# Patient Record
Sex: MALE | Race: AMERICAN INDIAN OR ALASKA NATIVE
[De-identification: names, ages, dates, MRNs, and addresses within clinical notes are randomized per-mention and may not be internally consistent; named-entity substitution may affect disease eponyms.]

---

## 2020-01-05 ENCOUNTER — HOSPITAL ENCOUNTER (INPATIENT)
Dept: HOSPITAL 5 - ED | Age: 75
LOS: 2 days | Discharge: HOME | DRG: 872 | End: 2020-01-07
Attending: INTERNAL MEDICINE | Admitting: INTERNAL MEDICINE
Payer: MEDICARE

## 2020-01-05 DIAGNOSIS — R39.12: ICD-10-CM

## 2020-01-05 DIAGNOSIS — E87.1: ICD-10-CM

## 2020-01-05 DIAGNOSIS — N40.1: ICD-10-CM

## 2020-01-05 DIAGNOSIS — N39.0: ICD-10-CM

## 2020-01-05 DIAGNOSIS — E86.0: ICD-10-CM

## 2020-01-05 DIAGNOSIS — E16.2: ICD-10-CM

## 2020-01-05 DIAGNOSIS — I10: ICD-10-CM

## 2020-01-05 DIAGNOSIS — A41.9: Primary | ICD-10-CM

## 2020-01-05 DIAGNOSIS — F17.210: ICD-10-CM

## 2020-01-05 LAB
ALBUMIN SERPL-MCNC: 3.9 G/DL (ref 3.9–5)
ALT SERPL-CCNC: 9 UNITS/L (ref 7–56)
BASOPHILS # (AUTO): 0 K/MM3 (ref 0–0.1)
BASOPHILS NFR BLD AUTO: 0.2 % (ref 0–1.8)
BILIRUB UR QL STRIP: (no result)
BLOOD UR QL VISUAL: (no result)
BUN SERPL-MCNC: 8 MG/DL (ref 9–20)
BUN/CREAT SERPL: 9 %
CALCIUM SERPL-MCNC: 9.8 MG/DL (ref 8.4–10.2)
EOSINOPHIL # BLD AUTO: 0 K/MM3 (ref 0–0.4)
EOSINOPHIL NFR BLD AUTO: 0.3 % (ref 0–4.3)
HCT VFR BLD CALC: 47.6 % (ref 35.5–45.6)
HEMOLYSIS INDEX: 22
HGB BLD-MCNC: 15.8 GM/DL (ref 11.8–15.2)
LYMPHOCYTES # BLD AUTO: 1.9 K/MM3 (ref 1.2–5.4)
LYMPHOCYTES NFR BLD AUTO: 13.5 % (ref 13.4–35)
MCHC RBC AUTO-ENTMCNC: 33 % (ref 32–34)
MCV RBC AUTO: 95 FL (ref 84–94)
MONOCYTES # (AUTO): 0.9 K/MM3 (ref 0–0.8)
MONOCYTES % (AUTO): 6.1 % (ref 0–7.3)
MUCOUS THREADS #/AREA URNS HPF: (no result) /HPF
PH UR STRIP: 5 [PH] (ref 5–7)
PLATELET # BLD: 202 K/MM3 (ref 140–440)
RBC # BLD AUTO: 5.02 M/MM3 (ref 3.65–5.03)
RBC #/AREA URNS HPF: > 182 /HPF (ref 0–6)
UROBILINOGEN UR-MCNC: < 2 MG/DL (ref ?–2)
WBC #/AREA URNS HPF: > 182 /HPF (ref 0–6)

## 2020-01-05 PROCEDURE — 81001 URINALYSIS AUTO W/SCOPE: CPT

## 2020-01-05 PROCEDURE — 87086 URINE CULTURE/COLONY COUNT: CPT

## 2020-01-05 PROCEDURE — 36415 COLL VENOUS BLD VENIPUNCTURE: CPT

## 2020-01-05 PROCEDURE — 87040 BLOOD CULTURE FOR BACTERIA: CPT

## 2020-01-05 PROCEDURE — 80048 BASIC METABOLIC PNL TOTAL CA: CPT

## 2020-01-05 PROCEDURE — 76770 US EXAM ABDO BACK WALL COMP: CPT

## 2020-01-05 PROCEDURE — 93010 ELECTROCARDIOGRAM REPORT: CPT

## 2020-01-05 PROCEDURE — 85025 COMPLETE CBC W/AUTO DIFF WBC: CPT

## 2020-01-05 PROCEDURE — 93005 ELECTROCARDIOGRAM TRACING: CPT

## 2020-01-05 PROCEDURE — 71046 X-RAY EXAM CHEST 2 VIEWS: CPT

## 2020-01-05 PROCEDURE — 80053 COMPREHEN METABOLIC PANEL: CPT

## 2020-01-05 PROCEDURE — 99406 BEHAV CHNG SMOKING 3-10 MIN: CPT

## 2020-01-05 PROCEDURE — 85027 COMPLETE CBC AUTOMATED: CPT

## 2020-01-05 PROCEDURE — 82140 ASSAY OF AMMONIA: CPT

## 2020-01-05 PROCEDURE — G0378 HOSPITAL OBSERVATION PER HR: HCPCS

## 2020-01-05 PROCEDURE — 84484 ASSAY OF TROPONIN QUANT: CPT

## 2020-01-05 PROCEDURE — 96374 THER/PROPH/DIAG INJ IV PUSH: CPT

## 2020-01-05 RX ADMIN — SODIUM CHLORIDE SCH MLS/HR: 0.9 INJECTION, SOLUTION INTRAVENOUS at 23:34

## 2020-01-05 NOTE — EMERGENCY DEPARTMENT REPORT
ED General Adult HPI





- General


Chief complaint: Fever


Stated complaint: FEVER


Time Seen by Provider: 01/05/20 12:47


Source: patient


Mode of arrival: Ambulatory


Limitations: No Limitations





- History of Present Illness


Initial comments: 





Patient is a 74-year-old  male with past history of hypertension

and probable BPH who is presenting with fever for the last 2 days.  Patient 

lives in [place] but him and his wife had driven to [place] for the holidays.  

There are no way back to [place] that the patient was feeling ill and didn't 

feel as though he continued on the Kin.  Patient's has mild nausea suprapubic

pain and dysuria and malaise.  Patient denies cough congestion.  Patient has had

subjective fevers and chills.


Severity scale (0 -10): 0





- Related Data


                                    Allergies











Allergy/AdvReac Type Severity Reaction Status Date / Time


 


No Known Allergies Allergy   Unverified 01/05/20 12:41














ED Review of Systems


ROS: 


Stated complaint: FEVER


Other details as noted in HPI





Comment: All other systems reviewed and negative





ED Past Medical Hx





- Past Medical History


Previous Medical History?: Yes


Hx Hypertension: Yes


Additional medical history: Right inquinal hernia, Dysuria with frequent 

urination





- Surgical History


Past Surgical History?: Yes


Additional Surgical History: Right inquinal hernia repair





- Social History


Smoking Status: Current Every Day Smoker


Substance Use Type: Alcohol





ED Physical Exam





- General


Limitations: No Limitations


General appearance: alert, in no apparent distress





- Head


Head exam: Present: atraumatic, normocephalic





- Eye


Eye exam: Present: normal appearance.  Absent: PERRL, EOMI





- ENT


ENT exam: Present: normal orophraynx, mucous membranes moist





- Neck


Neck exam: Present: normal inspection





- Respiratory


Respiratory exam: Present: normal lung sounds bilaterally.  Absent: respiratory 

distress, wheezes, rales, rhonchi, stridor





- Cardiovascular


Cardiovascular Exam: Present: normal rhythm, tachycardia.  Absent: systolic 

murmur, diastolic murmur, rubs, gallop





- GI/Abdominal


GI/Abdominal exam: Present: soft, tenderness (suprapubic), normal bowel sounds. 

Absent: distended, guarding, rebound





- Rectal


Rectal exam: Present: deferred





- Extremities Exam


Extremities exam: Present: normal inspection





- Back Exam


Back exam: Present: normal inspection





- Neurological Exam


Neurological exam: Present: alert, oriented X3





- Psychiatric


Psychiatric exam: Present: normal affect, normal mood





- Skin


Skin exam: Present: warm, dry, intact, normal color.  Absent: rash





ED Course





                                   Vital Signs











  01/05/20 01/05/20 01/05/20





  12:44 18:02 18:26


 


Temperature 98.8 F 100.5 F H 


 


Pulse Rate 110 H 94 H 


 


Respiratory 20 20 20





Rate   


 


Blood Pressure 186/89  


 


Blood Pressure  156/80 





[Right]   


 


O2 Sat by Pulse 96 94 





Oximetry   














  01/05/20





  19:12


 


Temperature 


 


Pulse Rate 


 


Respiratory 16





Rate 


 


Blood Pressure 


 


Blood Pressure 





[Right] 


 


O2 Sat by Pulse 





Oximetry 














ED Medical Decision Making





- Lab Data


Result diagrams: 


                                 01/05/20 14:22





                                 01/05/20 14:22








                                   Lab Results











  01/05/20 01/05/20 01/05/20 Range/Units





  14:22 14:22 16:22 


 


WBC  14.0 H    (4.5-11.0)  K/mm3


 


RBC  5.02    (3.65-5.03)  M/mm3


 


Hgb  15.8 H    (11.8-15.2)  gm/dl


 


Hct  47.6 H    (35.5-45.6)  %


 


MCV  95 H    (84-94)  fl


 


MCH  31    (28-32)  pg


 


MCHC  33    (32-34)  %


 


RDW  14.2    (13.2-15.2)  %


 


Plt Count  202    (140-440)  K/mm3


 


Lymph % (Auto)  13.5    (13.4-35.0)  %


 


Mono % (Auto)  6.1    (0.0-7.3)  %


 


Eos % (Auto)  0.3    (0.0-4.3)  %


 


Baso % (Auto)  0.2    (0.0-1.8)  %


 


Lymph #  1.9    (1.2-5.4)  K/mm3


 


Mono #  0.9 H    (0.0-0.8)  K/mm3


 


Eos #  0.0    (0.0-0.4)  K/mm3


 


Baso #  0.0    (0.0-0.1)  K/mm3


 


Seg Neutrophils %  79.9 H    (40.0-70.0)  %


 


Seg Neutrophils #  11.2 H    (1.8-7.7)  K/mm3


 


Sodium   131 L   (137-145)  mmol/L


 


Potassium   4.0   (3.6-5.0)  mmol/L


 


Chloride   97.9 L   ()  mmol/L


 


Carbon Dioxide   22   (22-30)  mmol/L


 


Anion Gap   15   mmol/L


 


BUN   8 L   (9-20)  mg/dL


 


Creatinine   0.9   (0.8-1.5)  mg/dL


 


Estimated GFR   > 60   ml/min


 


BUN/Creatinine Ratio   9   %


 


Glucose   114 H   ()  mg/dL


 


Lactic Acid     (0.7-2.0)  mmol/L


 


Calcium   9.8   (8.4-10.2)  mg/dL


 


Total Bilirubin   0.60   (0.1-1.2)  mg/dL


 


AST   15   (5-40)  units/L


 


ALT   9   (7-56)  units/L


 


Alkaline Phosphatase   71   ()  units/L


 


Troponin T   < 0.010  < 0.010  (0.00-0.029)  ng/mL


 


Total Protein   7.1   (6.3-8.2)  g/dL


 


Albumin   3.9   (3.9-5)  g/dL


 


Albumin/Globulin Ratio   1.2   %


 


Urine Color     (Yellow)  


 


Urine Turbidity     (Clear)  


 


Urine pH     (5.0-7.0)  


 


Ur Specific Gravity     (1.003-1.030)  


 


Urine Protein     (Negative)  mg/dL


 


Urine Glucose (UA)     (Negative)  mg/dL


 


Urine Ketones     (Negative)  mg/dL


 


Urine Blood     (Negative)  


 


Urine Nitrite     (Negative)  


 


Urine Bilirubin     (Negative)  


 


Urine Urobilinogen     (<2.0)  mg/dL


 


Ur Leukocyte Esterase     (Negative)  


 


Urine WBC (Auto)     (0.0-6.0)  /HPF


 


Urine RBC (Auto)     (0.0-6.0)  /HPF


 


Urine WBC Clumps     /HPF


 


Urine Mucus     /HPF














  01/05/20 01/05/20 Range/Units





  18:21 Unknown 


 


WBC    (4.5-11.0)  K/mm3


 


RBC    (3.65-5.03)  M/mm3


 


Hgb    (11.8-15.2)  gm/dl


 


Hct    (35.5-45.6)  %


 


MCV    (84-94)  fl


 


MCH    (28-32)  pg


 


MCHC    (32-34)  %


 


RDW    (13.2-15.2)  %


 


Plt Count    (140-440)  K/mm3


 


Lymph % (Auto)    (13.4-35.0)  %


 


Mono % (Auto)    (0.0-7.3)  %


 


Eos % (Auto)    (0.0-4.3)  %


 


Baso % (Auto)    (0.0-1.8)  %


 


Lymph #    (1.2-5.4)  K/mm3


 


Mono #    (0.0-0.8)  K/mm3


 


Eos #    (0.0-0.4)  K/mm3


 


Baso #    (0.0-0.1)  K/mm3


 


Seg Neutrophils %    (40.0-70.0)  %


 


Seg Neutrophils #    (1.8-7.7)  K/mm3


 


Sodium    (137-145)  mmol/L


 


Potassium    (3.6-5.0)  mmol/L


 


Chloride    ()  mmol/L


 


Carbon Dioxide    (22-30)  mmol/L


 


Anion Gap    mmol/L


 


BUN    (9-20)  mg/dL


 


Creatinine    (0.8-1.5)  mg/dL


 


Estimated GFR    ml/min


 


BUN/Creatinine Ratio    %


 


Glucose    ()  mg/dL


 


Lactic Acid  1.60   (0.7-2.0)  mmol/L


 


Calcium    (8.4-10.2)  mg/dL


 


Total Bilirubin    (0.1-1.2)  mg/dL


 


AST    (5-40)  units/L


 


ALT    (7-56)  units/L


 


Alkaline Phosphatase    ()  units/L


 


Troponin T    (0.00-0.029)  ng/mL


 


Total Protein    (6.3-8.2)  g/dL


 


Albumin    (3.9-5)  g/dL


 


Albumin/Globulin Ratio    %


 


Urine Color   Yellow  (Yellow)  


 


Urine Turbidity   Cloudy  (Clear)  


 


Urine pH   5.0  (5.0-7.0)  


 


Ur Specific Gravity   1.019  (1.003-1.030)  


 


Urine Protein   30 mg/dl  (Negative)  mg/dL


 


Urine Glucose (UA)   Neg  (Negative)  mg/dL


 


Urine Ketones   Neg  (Negative)  mg/dL


 


Urine Blood   Lg  (Negative)  


 


Urine Nitrite   Pos  (Negative)  


 


Urine Bilirubin   Neg  (Negative)  


 


Urine Urobilinogen   < 2.0  (<2.0)  mg/dL


 


Ur Leukocyte Esterase   Lg  (Negative)  


 


Urine WBC (Auto)   > 182.0 H  (0.0-6.0)  /HPF


 


Urine RBC (Auto)   > 182.0  (0.0-6.0)  /HPF


 


Urine WBC Clumps   1+  /HPF


 


Urine Mucus   Few  /HPF














- Medical Decision Making





Patient is a 74-year-old male who is presenting with suprapubic discomfort fever

 and dysuria.  Patient has a compensated urinary tract infection.  White count 

is elevated.  Patient will be started on cefepime admitted to the hospital 

observation status.  Lactic acid is been ordered.  Fluids been initiated the 

patient's mild tachycardia and hyponatremia.


Critical Care Time: Yes (30)


Critical care attestation.: 


If time is entered above; I have spent that time in minutes in the direct care 

of this critically ill patient, excluding procedure time.








ED Disposition


Clinical Impression: 


 Complicated UTI (urinary tract infection), Sepsis, Hyponatremia, Mild 

dehydration





Disposition: DC-09 OP ADMIT IP TO THIS HOSP


Is pt being admited?: Yes


Does the pt Need Aspirin: No


Condition: Stable


Time of Disposition: 19:41

## 2020-01-05 NOTE — EVENT NOTE
ED Screening Note


Date of service: 01/05/20


Time: 12:48


ED Screening Note: 


Pt complains of right chest discomfort, fever, and urinary urgency x yesterday 


+dysuria 


denies cough or SOB





This initial assessment/diagnostic orders/clinical plan/treatment(s) is/are 

subject to change based on patients health status, clinical progression and re-

assessment by fellow clinical providers in the ED. Further treatment and workup 

at subsequent clinical providers discretion. Patient/guardian urged not to elope

from the ED as their condition may be serious if not clinically assessed and 

managed. 





Initial orders include: 


CXR


labs


EKG

## 2020-01-05 NOTE — HISTORY AND PHYSICAL REPORT
History of Present Illness


Date of admission: 


01/05/20 19:42





Chief complaint: 





Dysuria and pelvic pain


History of present illness: 





74-year-old man with a history of BPH.  He presents to the hospital complaining 

of dysuria, poor stream of urination.  Fever, feeling ill.  He also noted that 

he had a small amounts of blood at the end of urination.  Him and his wife were 

driving back from [place] to [place] on a road trip when he became very ill and

he had to stop in the hospital.  The pain in his lower abdomen/pelvis became so 

severe that he thought perhaps that hernia was bursting his stomach as he had 

hernias in the past.  The pain has now resolved.





Past medical history; BPH, off medications that [facility].  Hypertension, prediabetes





Past surgical history, hernia repair x2





Social history, smoker, last cigarette was 2 days ago, denies alcohol or illicit

drug use.  He is retired and lives with his wife he is a .





Family history denies any family history of prostate cancer or prostate issues 

and other family members.











Medications and Allergies


                                    Allergies











Allergy/AdvReac Type Severity Reaction Status Date / Time


 


No Known Allergies Allergy   Unverified 01/05/20 12:41











                                Home Medications











 Medication  Instructions  Recorded  Confirmed  Last Taken  Type


 


amLODIPine [Norvasc] 10 mg PO DAILY 01/05/20 01/05/20 Unknown History











Active Meds: 


Active Medications





Acetaminophen (Tylenol)  650 mg PO Q4H PRN


   PRN Reason: Pain MILD(1-3)/Fever >100.5/HA


Amlodipine Besylate (Amlodipine)  10 mg PO DAILY [facility]


Enoxaparin Sodium (Enoxaparin)  40 mg SUB-Q QDAY@2200 [facility]


Cefepime HCl (Cefepime/Ns 2 Gm/100 Ml)  2 gm in 100 mls @ 200 mls/hr IV Q12HR 

[facility]; Protocol


   Last Admin: 01/05/20 19:12 Dose:  200 mls/hr


   Documented by: 


Ceftriaxone Sodium (Rocephin/Ns 1 Gm/50 Ml)  1 gm in 50 mls @ 100 mls/hr IV 

Q24HR [facility]; Protocol


Sodium Chloride (Nacl 0.9% 1000 Ml)  1,000 mls @ 100 mls/hr IV AS DIRECT [facility]


Ondansetron HCl (Zofran)  4 mg IV Q8H PRN


   PRN Reason: Nausea And Vomiting


Sodium Chloride (Sodium Chloride Flush Syringe 10 Ml)  10 ml IV BID [facility]


Sodium Chloride (Sodium Chloride Flush Syringe 10 Ml)  10 ml IV PRN PRN


   PRN Reason: LINE FLUSH


Tamsulosin HCl (Flomax)  0.4 mg PO QDAY [facility]











Review of Systems


All systems: negative (See HPI)





Exam





- Constitutional


Vitals: 


                                        











Temp Pulse Resp BP Pulse Ox


 


 99.8 F H  85   16   117/65   93 


 


 01/05/20 19:10  01/05/20 19:10  01/05/20 19:26  01/05/20 19:10  01/05/20 19:10











General appearance: Present: no acute distress, well-nourished





- EENT


Eyes: Present: PERRL


ENT: hearing intact, clear oral mucosa





- Neck


Neck: Present: supple, normal ROM





- Respiratory


Respiratory effort: normal


Respiratory: bilateral: CTA





- Cardiovascular


Heart Sounds: Present: S1 & S2.  Absent: rub, click





- Extremities


Extremities: pulses symmetrical, No edema


Peripheral Pulses: within normal limits





- Abdominal


General gastrointestinal: Present: soft, non-tender, non-distended, normal bowel

 sounds


Male genitourinary: Present: normal





- Integumentary


Integumentary: Present: clear, warm, dry





- Musculoskeletal


Musculoskeletal: gait normal, strength equal bilaterally





- Psychiatric


Psychiatric: appropriate mood/affect, intact judgment & insight





- Neurologic


Neurologic: CNII-XII intact, moves all extremities





Results





- Labs


CBC & Chem 7: 


                                 01/05/20 14:22





                                 01/05/20 14:22


Labs: 


                             Laboratory Last Values











WBC  14.0 K/mm3 (4.5-11.0)  H  01/05/20  14:22    


 


RBC  5.02 M/mm3 (3.65-5.03)   01/05/20  14:22    


 


Hgb  15.8 gm/dl (11.8-15.2)  H  01/05/20  14:22    


 


Hct  47.6 % (35.5-45.6)  H  01/05/20  14:22    


 


MCV  95 fl (84-94)  H  01/05/20  14:22    


 


MCH  31 pg (28-32)   01/05/20  14:22    


 


MCHC  33 % (32-34)   01/05/20  14:22    


 


RDW  14.2 % (13.2-15.2)   01/05/20  14:22    


 


Plt Count  202 K/mm3 (140-440)   01/05/20  14:22    


 


Lymph % (Auto)  13.5 % (13.4-35.0)   01/05/20  14:22    


 


Mono % (Auto)  6.1 % (0.0-7.3)   01/05/20  14:22    


 


Eos % (Auto)  0.3 % (0.0-4.3)   01/05/20  14:22    


 


Baso % (Auto)  0.2 % (0.0-1.8)   01/05/20  14:22    


 


Lymph #  1.9 K/mm3 (1.2-5.4)   01/05/20  14:22    


 


Mono #  0.9 K/mm3 (0.0-0.8)  H  01/05/20  14:22    


 


Eos #  0.0 K/mm3 (0.0-0.4)   01/05/20  14:22    


 


Baso #  0.0 K/mm3 (0.0-0.1)   01/05/20  14:22    


 


Seg Neutrophils %  79.9 % (40.0-70.0)  H  01/05/20  14:22    


 


Seg Neutrophils #  11.2 K/mm3 (1.8-7.7)  H  01/05/20  14:22    


 


Sodium  131 mmol/L (137-145)  L  01/05/20  14:22    


 


Potassium  4.0 mmol/L (3.6-5.0)   01/05/20  14:22    


 


Chloride  97.9 mmol/L ()  L  01/05/20  14:22    


 


Carbon Dioxide  22 mmol/L (22-30)   01/05/20  14:22    


 


Anion Gap  15 mmol/L  01/05/20  14:22    


 


BUN  8 mg/dL (9-20)  L  01/05/20  14:22    


 


Creatinine  0.9 mg/dL (0.8-1.5)   01/05/20  14:22    


 


Estimated GFR  > 60 ml/min  01/05/20  14:22    


 


BUN/Creatinine Ratio  9 %  01/05/20  14:22    


 


Glucose  114 mg/dL ()  H  01/05/20  14:22    


 


Lactic Acid  1.60 mmol/L (0.7-2.0)   01/05/20  18:21    


 


Calcium  9.8 mg/dL (8.4-10.2)   01/05/20  14:22    


 


Total Bilirubin  0.60 mg/dL (0.1-1.2)   01/05/20  14:22    


 


AST  15 units/L (5-40)   01/05/20  14:22    


 


ALT  9 units/L (7-56)   01/05/20  14:22    


 


Alkaline Phosphatase  71 units/L ()   01/05/20  14:22    


 


Troponin T  < 0.010 ng/mL (0.00-0.029)   01/05/20  16:22    


 


Total Protein  7.1 g/dL (6.3-8.2)   01/05/20  14:22    


 


Albumin  3.9 g/dL (3.9-5)   01/05/20  14:22    


 


Albumin/Globulin Ratio  1.2 %  01/05/20  14:22    


 


Urine Color  Yellow  (Yellow)   01/05/20  Unknown


 


Urine Turbidity  Cloudy  (Clear)   01/05/20  Unknown


 


Urine pH  5.0  (5.0-7.0)   01/05/20  Unknown


 


Ur Specific Gravity  1.019  (1.003-1.030)   01/05/20  Unknown


 


Urine Protein  30 mg/dl mg/dL (Negative)   01/05/20  Unknown


 


Urine Glucose (UA)  Neg mg/dL (Negative)   01/05/20  Unknown


 


Urine Ketones  Neg mg/dL (Negative)   01/05/20  Unknown


 


Urine Blood  Lg  (Negative)   01/05/20  Unknown


 


Urine Nitrite  Pos  (Negative)   01/05/20  Unknown


 


Urine Bilirubin  Neg  (Negative)   01/05/20  Unknown


 


Urine Urobilinogen  < 2.0 mg/dL (<2.0)   01/05/20  Unknown


 


Ur Leukocyte Esterase  Lg  (Negative)   01/05/20  Unknown


 


Urine WBC (Auto)  > 182.0 /HPF (0.0-6.0)  H  01/05/20  Unknown


 


Urine RBC (Auto)  > 182.0 /HPF (0.0-6.0)   01/05/20  Unknown


 


Urine WBC Clumps  1+ /HPF  01/05/20  Unknown


 


Urine Mucus  Few /HPF  01/05/20  Unknown














- Imaging and Cardiology


Chest x-ray: image reviewed (No acute findings)





Assessment and Plan


Assessment and plan: 





74-year-old man who presents with sepsis UTI and poor urinary stream.





UTI/sepsis


Was most likely caused by obstructive uropathy from his prostate issues


Obtain urine cultures, antibiotics, IV fluids





BPH with poor urinary stream


Started on Flomax, he will need a prescription at the time of discharge





Tobacco abuse/dependence


Smoking cessation counseling performed for 10 minutes, nicotine patches when 

necessary





Hyponatremia, IV fluids





DVT prophylaxis with Lovenox





Preventative health counseling performed for 17 minutes

## 2020-01-05 NOTE — XRAY REPORT
CHEST 2 VIEWS 



INDICATION: 

MAIN: chest pain    PT SD COUGH AND CHILLS. ALONGSIDE PAIN UPON URINATION ...JTS.



COMPARISON: 

4/1/2009.



FINDINGS:

Support devices: None.



Heart: Within normal limits. 

Lungs/Pleura: No acute air space or interstitial disease.   No significant pleural effusion. 



IMPRESSION:  No acute findings.



Signer Name: Lefty Medina MD 

Signed: 1/5/2020 1:30 PM

 Workstation Name: [id number]

## 2020-01-06 LAB
BACTERIA #/AREA URNS HPF: (no result) /HPF
BASOPHILS # (AUTO): 0 K/MM3 (ref 0–0.1)
BASOPHILS NFR BLD AUTO: 0.3 % (ref 0–1.8)
BILIRUB UR QL STRIP: (no result)
BLOOD UR QL VISUAL: (no result)
BUN SERPL-MCNC: 9 MG/DL (ref 9–20)
BUN/CREAT SERPL: 10 %
CALCIUM SERPL-MCNC: 8.8 MG/DL (ref 8.4–10.2)
EOSINOPHIL # BLD AUTO: 0 K/MM3 (ref 0–0.4)
EOSINOPHIL NFR BLD AUTO: 0.2 % (ref 0–4.3)
HCT VFR BLD CALC: 45.7 % (ref 35.5–45.6)
HEMOLYSIS INDEX: 8
HGB BLD-MCNC: 15 GM/DL (ref 11.8–15.2)
LYMPHOCYTES # BLD AUTO: 2.5 K/MM3 (ref 1.2–5.4)
LYMPHOCYTES NFR BLD AUTO: 18 % (ref 13.4–35)
MCHC RBC AUTO-ENTMCNC: 33 % (ref 32–34)
MCV RBC AUTO: 96 FL (ref 84–94)
MONOCYTES # (AUTO): 0.9 K/MM3 (ref 0–0.8)
MONOCYTES % (AUTO): 6.8 % (ref 0–7.3)
MUCOUS THREADS #/AREA URNS HPF: (no result) /HPF
PH UR STRIP: 5 [PH] (ref 5–7)
PLATELET # BLD: 180 K/MM3 (ref 140–440)
PROT UR STRIP-MCNC: (no result) MG/DL
RBC # BLD AUTO: 4.78 M/MM3 (ref 3.65–5.03)
RBC #/AREA URNS HPF: 10 /HPF (ref 0–6)
UROBILINOGEN UR-MCNC: < 2 MG/DL (ref ?–2)
WBC #/AREA URNS HPF: 74 /HPF (ref 0–6)

## 2020-01-06 RX ADMIN — SODIUM CHLORIDE SCH MLS/HR: 0.9 INJECTION, SOLUTION INTRAVENOUS at 11:52

## 2020-01-06 RX ADMIN — ACETAMINOPHEN PRN MG: 325 TABLET ORAL at 01:35

## 2020-01-06 RX ADMIN — Medication SCH ML: at 12:07

## 2020-01-06 RX ADMIN — ACETAMINOPHEN PRN MG: 325 TABLET ORAL at 23:42

## 2020-01-06 RX ADMIN — TAMSULOSIN HYDROCHLORIDE SCH MG: 0.4 CAPSULE ORAL at 11:53

## 2020-01-06 RX ADMIN — CEFTRIAXONE SODIUM SCH MLS/HR: 1 INJECTION, POWDER, FOR SOLUTION INTRAMUSCULAR; INTRAVENOUS at 11:52

## 2020-01-06 NOTE — ULTRASOUND REPORT
ULTRASOUND RENAL



INDICATION / CLINICAL INFORMATION:

CYSTITIS.



COMPARISON:

None available.



FINDINGS:

RIGHT KIDNEY: Length = 11.3 cm.    [normal > 9 cm] 

- Parenchymal Thickness = 0.8 cm.  [normal > 1.5 cm] 

- Echogenicity: Increased

- Hydronephrosis: None.

- Cyst or mass: No significant abnormality.  

- Stones: None seen. 



LEFT KIDNEY: Length = 12.8 cm.       [normal > 9 cm] 

- Parenchymal Thickness = 1.8 cm.  [normal > 1.5 cm] 

- Echogenicity: Increased

- Hydronephrosis: None.

- Cyst or mass: No significant abnormality.  

- Stones: None seen. 



URINARY BLADDER: The bladder is poorly distended but diffuse bladder wall thickening is suspected sherlyn
suring up to 1 cm.

FREE FLUID: None.



ADDITIONAL FINDINGS: None.



IMPRESSION:

 Nonspecific renal disease. Findings consistent with cystitis.



Signer Name: Jesse Montoya Jr, MD 

Signed: 1/6/2020 3:53 PM

 Workstation Name: [id number]

## 2020-01-06 NOTE — PROGRESS NOTE
Assessment and Plan


Assessment and plan: 


74-year-old man with a history of BPH, Hernia Repair  He presents to the 

hospital complaining of dysuria, poor stream of urination.  Fever, feeling ill. 

He also noted that he had a small amounts of blood at the end of urination.  Him

and his wife were driving back from [place] to [place] on a road trip when he 

became very ill and he had to stop in the hospital.  The pain in his lower 

abdomen/pelvis became so severe that he thought perhaps that hernia was bursting

his stomach as he had hernias in the past.  The pain has now resolved.








UTI/sepsis possible Underlying Prostatitis


Was most likely caused by obstructive uropathy from his prostate issues


Obtain urine cultures, antibiotics, IV fluids


Will check Renal ultrasound. 





BPH with poor urinary stream


Started on Flomax, he will need a prescription at the time of discharge


[facility]





Tobacco abuse/dependence


Smoking cessation counseling performed for 15 minutes reinforcement, nicotine 

patches when necessary





Hyponatremia, IV fluids





Hypoglycemia- Monitor closely, 





DVT prophylaxis with Lovenox





Preventative health counseling performed for 17 minutes


Will change patient to inpatient due to continued persistent Fever.








History


Interval history: 


Patient seen and examined today resting comfortably reports to me that he has 

been treated in the past for prostatitis.  He has been having dribbling of 

urine.  With overflow incontinence occasionally.








Hospitalist Physical





- Physical exam


Narrative exam: 





VITAL SIGNS:  Reviewed.    


GENERAL:  The patient appears normally developed, family at bedside.  Vital 

signs as documented.


HEAD:  No signs of head trauma.


EYES:  Pupils are equal.  Extraocular motions intact.  


EARS:  Hearing grossly intact.


MOUTH:  Oropharynx is normal. 


NECK:  No adenopathy, no JVD.  


CHEST:  Chest with clear breath sounds bilaterally.  No wheezes, rales, or 

rhonchi.  


CARDIAC:  Regular rate and rhythm.  S1 and S2, without murmurs, gallops, or 

rubs.


VASCULAR:  No Edema.  Peripheral pulses normal and equal in all extremities.


ABDOMEN:  Soft, non tender and non distended.  No   rebound or guarding, and no 

masses palpated.   Bowel Sounds normal.


MUSCULOSKELETAL:  Good range of motion of all major joints. Extremities without 

clubbing, cyanosis or edema.  


NEUROLOGIC EXAM:  Alert and oriented x 3   No focal sensory or strength 

deficits.   Speech normal.  Follows commands.


PSYCHIATRIC:  Mood normal.


SKIN:  detial exam as documented in skin assessment





- Constitutional


Vitals: 


                                        











Temp Pulse Resp BP Pulse Ox


 


 99.9 F H  88   20   108/48   90 


 


 01/06/20 04:22  01/06/20 01:46  01/06/20 01:46  01/06/20 01:46  01/06/20 01:46











General appearance: Present: no acute distress, well-nourished





Results





- Labs


CBC & Chem 7: 


                                 01/06/20 04:03





                                 01/06/20 04:03


Labs: 


                             Laboratory Last Values











WBC  13.9 K/mm3 (4.5-11.0)  H  01/06/20  04:03    


 


RBC  4.78 M/mm3 (3.65-5.03)   01/06/20  04:03    


 


Hgb  15.0 gm/dl (11.8-15.2)   01/06/20  04:03    


 


Hct  45.7 % (35.5-45.6)  H  01/06/20  04:03    


 


MCV  96 fl (84-94)  H  01/06/20  04:03    


 


MCH  32 pg (28-32)   01/06/20  04:03    


 


MCHC  33 % (32-34)   01/06/20  04:03    


 


RDW  14.1 % (13.2-15.2)   01/06/20  04:03    


 


Plt Count  180 K/mm3 (140-440)   01/06/20  04:03    


 


Lymph % (Auto)  18.0 % (13.4-35.0)   01/06/20  04:03    


 


Mono % (Auto)  6.8 % (0.0-7.3)   01/06/20  04:03    


 


Eos % (Auto)  0.2 % (0.0-4.3)   01/06/20  04:03    


 


Baso % (Auto)  0.3 % (0.0-1.8)   01/06/20  04:03    


 


Lymph #  2.5 K/mm3 (1.2-5.4)   01/06/20  04:03    


 


Mono #  0.9 K/mm3 (0.0-0.8)  H  01/06/20  04:03    


 


Eos #  0.0 K/mm3 (0.0-0.4)   01/06/20  04:03    


 


Baso #  0.0 K/mm3 (0.0-0.1)   01/06/20  04:03    


 


Seg Neutrophils %  74.7 % (40.0-70.0)  H  01/06/20  04:03    


 


Seg Neutrophils #  10.4 K/mm3 (1.8-7.7)  H  01/06/20  04:03    


 


Sodium  134 mmol/L (137-145)  L  01/06/20  04:03    


 


Potassium  3.9 mmol/L (3.6-5.0)   01/06/20  04:03    


 


Chloride  98.4 mmol/L ()   01/06/20  04:03    


 


Carbon Dioxide  20 mmol/L (22-30)  L  01/06/20  04:03    


 


Anion Gap  20 mmol/L  01/06/20  04:03    


 


BUN  9 mg/dL (9-20)   01/06/20  04:03    


 


Creatinine  0.9 mg/dL (0.8-1.5)   01/06/20  04:03    


 


Estimated GFR  > 60 ml/min  01/06/20  04:03    


 


BUN/Creatinine Ratio  10 %  01/06/20  04:03    


 


Glucose  63 mg/dL ()  L  01/06/20  04:03    


 


Lactic Acid  1.20 mmol/L (0.7-2.0)   01/05/20  23:42    


 


Calcium  8.8 mg/dL (8.4-10.2)   01/06/20  04:03    


 


Total Bilirubin  0.60 mg/dL (0.1-1.2)   01/05/20  14:22    


 


AST  15 units/L (5-40)   01/05/20  14:22    


 


ALT  9 units/L (7-56)   01/05/20  14:22    


 


Alkaline Phosphatase  71 units/L ()   01/05/20  14:22    


 


Troponin T  < 0.010 ng/mL (0.00-0.029)   01/05/20  16:22    


 


Total Protein  7.1 g/dL (6.3-8.2)   01/05/20  14:22    


 


Albumin  3.9 g/dL (3.9-5)   01/05/20  14:22    


 


Albumin/Globulin Ratio  1.2 %  01/05/20  14:22    


 


Urine Color  Yellow  (Yellow)   01/05/20  Unknown


 


Urine Turbidity  Cloudy  (Clear)   01/05/20  Unknown


 


Urine pH  5.0  (5.0-7.0)   01/05/20  Unknown


 


Ur Specific Gravity  1.019  (1.003-1.030)   01/05/20  Unknown


 


Urine Protein  30 mg/dl mg/dL (Negative)   01/05/20  Unknown


 


Urine Glucose (UA)  Neg mg/dL (Negative)   01/05/20  Unknown


 


Urine Ketones  Neg mg/dL (Negative)   01/05/20  Unknown


 


Urine Blood  Lg  (Negative)   01/05/20  Unknown


 


Urine Nitrite  Pos  (Negative)   01/05/20  Unknown


 


Urine Bilirubin  Neg  (Negative)   01/05/20  Unknown


 


Urine Urobilinogen  < 2.0 mg/dL (<2.0)   01/05/20  Unknown


 


Ur Leukocyte Esterase  Lg  (Negative)   01/05/20  Unknown


 


Urine WBC (Auto)  > 182.0 /HPF (0.0-6.0)  H  01/05/20  Unknown


 


Urine RBC (Auto)  > 182.0 /HPF (0.0-6.0)   01/05/20  Unknown


 


Urine WBC Clumps  1+ /HPF  01/05/20  Unknown


 


Urine Mucus  Few /HPF  01/05/20  Unknown














Active Medications





- Current Medications


Current Medications: 














Generic Name Dose Route Start Last Admin





  Trade Name Freq  PRN Reason Stop Dose Admin


 


Acetaminophen  650 mg  01/05/20 22:33  01/06/20 01:35





  Tylenol  PO   650 mg





  Q4H PRN   Administration





  Pain MILD(1-3)/Fever >100.5/HA  


 


Amlodipine Besylate  10 mg  01/06/20 10:00 





  Amlodipine  PO  





  DAILY [facility]  


 


Enoxaparin Sodium  40 mg  01/06/20 22:00 





  Enoxaparin  SUB-Q  





  QDAY@2200 [facility]  


 


Ceftriaxone Sodium  1 gm in 50 mls @ 100 mls/hr  01/06/20 10:00 





  Rocephin/Ns 1 Gm/50 Ml  IV  





  Q24HR [facility]  





  Protocol  


 


Sodium Chloride  1,000 mls @ 100 mls/hr  01/05/20 22:45  01/05/20 23:34





  Nacl 0.9% 1000 Ml  IV   100 mls/hr





  AS DIRECT JOSÉ ANTONIO   Administration


 


Ondansetron HCl  4 mg  01/05/20 22:33 





  Zofran  IV  





  Q8H PRN  





  Nausea And Vomiting  


 


Sodium Chloride  10 ml  01/06/20 10:00 





  Sodium Chloride Flush Syringe 10 Ml  IV  





  BID JOSÉ ANTONIO  


 


Sodium Chloride  10 ml  01/05/20 22:33 





  Sodium Chloride Flush Syringe 10 Ml  IV  





  PRN PRN  





  LINE FLUSH  


 


Tamsulosin HCl  0.4 mg  01/06/20 10:00 





  Flomax  PO  





  QDAY JOSÉ ANTONIO

## 2020-01-07 VITALS — SYSTOLIC BLOOD PRESSURE: 164 MMHG | DIASTOLIC BLOOD PRESSURE: 74 MMHG

## 2020-01-07 LAB
BUN SERPL-MCNC: 8 MG/DL (ref 9–20)
BUN/CREAT SERPL: 8 %
CALCIUM SERPL-MCNC: 8.5 MG/DL (ref 8.4–10.2)
HCT VFR BLD CALC: 41.7 % (ref 35.5–45.6)
HEMOLYSIS INDEX: 11
HGB BLD-MCNC: 13.9 GM/DL (ref 11.8–15.2)
MCHC RBC AUTO-ENTMCNC: 33 % (ref 32–34)
MCV RBC AUTO: 95 FL (ref 84–94)
PLATELET # BLD: 156 K/MM3 (ref 140–440)
RBC # BLD AUTO: 4.41 M/MM3 (ref 3.65–5.03)

## 2020-01-07 RX ADMIN — CEFTRIAXONE SODIUM SCH MLS/HR: 1 INJECTION, POWDER, FOR SOLUTION INTRAMUSCULAR; INTRAVENOUS at 10:28

## 2020-01-07 RX ADMIN — TAMSULOSIN HYDROCHLORIDE SCH MG: 0.4 CAPSULE ORAL at 10:29

## 2020-01-07 RX ADMIN — Medication SCH ML: at 10:29

## 2020-01-07 NOTE — DISCHARGE SUMMARY
Providers





- Providers


Date of Admission: 


01/06/20 07:57





Attending physician: 


RASHMI HILTON MD





                                        





01/06/20 12:17


Physical Therapy Evaluation and Treat [CONS] Routine 


   Comment: 


   Reason For Exam: Weakness











Primary care physician: 


PRIMARY CARE MD








Hospitalization


Condition: Stable


Hospital course: 





CYTITIS ON RENAL US


Disposition: DC-01 TO HOME OR SELFCARE


Time spent for discharge: 35 MINS





Exam





- Constitutional


Vitals: 


                                        











Temp Pulse Resp BP Pulse Ox


 


 100.7 F H  87   17   108/47   92 


 


 01/06/20 20:08  01/06/20 20:08  01/06/20 20:08  01/06/20 20:08  01/06/20 20:08














Plan


Activity: advance as tolerated, fall precautions


Diet: low fat


Special Instructions: record daily weights, record daily BP diary


Additional Instructions: [facility]


Follow up with: 


PRIMARY CARE,MD [Primary Care Provider] - 7 Days


Prescriptions: 


Tamsulosin [Flomax] 0.4 mg PO QDAY #30 capsule


levoFLOXacin [Levaquin] 750 mg PO QDAY #7 tablet